# Patient Record
(demographics unavailable — no encounter records)

---

## 2025-01-30 NOTE — HISTORY OF PRESENT ILLNESS
[Never] : never [Difficulty Initiating Sleep] : difficulty initiating sleep [Difficulty Maintaining Sleep] : difficulty maintaining sleep [TextBox_4] : She was evaluated by cardiology in November 2024.  The evaluation included a stress test and an echocardiogram.  She was told that everything was okay.  She continues to complain of intermittent chest discomfort.  A few weeks ago she was seen in the emergency room at [facility].  She was evaluated and discharged.  She is not aware of any specific diagnosis which was given to her.  She was seen by her primary care provider on 1/3/2025.  She was diagnosed with pleurisy.  She was given antibiotics.    At the present time she denies any cough, wheezing or shortness of breath.  She is not using any inhalers.  She does not smoke.  She never smoked. [Awakes Unrefreshed] : does not awaken unrefreshed

## 2025-01-30 NOTE — DISCUSSION/SUMMARY
[FreeTextEntry1] : She is a 60-year-old woman, a never smoker, who presented with a cough.  She is no longer coughing.  She is not using any inhalers.  Impression Atypical chest discomfort -Cardiology evaluation was negative Reactive airways disease -Not active at the present time  Recommend Chest radiograph requested Continue with albuterol as needed, if needed Follow-up in 1 month or sooner if needed

## 2025-01-30 NOTE — REVIEW OF SYSTEMS
[Back Pain] : ~T back pain [Arthralgias] : arthralgias [Chest Tightness] : chest tightness [Fatigue] : no fatigue [Poor Appetite] : normal appetite  [Nasal Congestion] : no nasal congestion [Postnasal Drip] : no postnasal drip [Cough] : no cough [Sputum] : not coughing up ~M sputum [Hemoptysis] : no hemoptysis [Dyspnea] : no dyspnea [Pleuritic Pain] : no pleuritic pain [Wheezing] : no wheezing [Hypertension] : no ~T hypertension [Edema] : ~T edema was not present [Hay Fever] : no hay fever [Itchy Eyes] : no itching of ~T the eyes [Heartburn] : no heartburn [Reflux] : no reflux [Nocturia] : no nocturia [Rash] : no [unfilled] rash [Anemia] : no anemia [Diabetes] : no diabetes mellitus [Rheumatologic] : no ~T rheumatologic disorder

## 2025-01-30 NOTE — PHYSICAL EXAM
[Well Groomed] : well groomed [General Appearance - In No Acute Distress] : no acute distress [Neck Cervical Mass (___cm)] : no neck mass was observed [Heart Rate And Rhythm] : heart rate and rhythm were normal [Heart Sounds] : normal S1 and S2 [Murmurs] : no murmurs present [Auscultation Breath Sounds / Voice Sounds] : lungs were clear to auscultation bilaterally [Abdomen Tenderness] : non-tender [Abnormal Walk] : normal gait [Cyanosis, Localized] : no localized cyanosis [Skin Turgor] : normal skin turgor [] : no rash [No Focal Deficits] : no focal deficits [Oriented To Time, Place, And Person] : oriented to person, place, and time

## 2025-01-30 NOTE — PROCEDURE
[FreeTextEntry1] : Chest x-ray 7/26/18: Persistent blunting at the right costophrenic angle. Felt to be on the basis of pleural thickening.  Chest x-ray 11/22/22: Clear.  PFT 3/1918: No obstruction. Mild restriction. Mild reduction in diffusion.   PFT 3/2/23: Mild obstruction. Mild improvement after bronchodilator.   PFT 1/30/25: No obstruction.  Mild restriction.  Mild reduction in diffusion.  No significant change after bronchodilator.

## 2025-02-27 NOTE — PHYSICAL EXAM
[General Appearance - In No Acute Distress] : no acute distress [Neck Cervical Mass (___cm)] : no neck mass was observed [Heart Rate And Rhythm] : heart rate and rhythm were normal [Heart Sounds] : normal S1 and S2 [Auscultation Breath Sounds / Voice Sounds] : lungs were clear to auscultation bilaterally [Abdomen Tenderness] : non-tender [Abnormal Walk] : normal gait [Cyanosis, Localized] : no localized cyanosis [Skin Turgor] : normal skin turgor [] : no rash [No Focal Deficits] : no focal deficits [Oriented To Time, Place, And Person] : oriented to person, place, and time

## 2025-02-27 NOTE — REVIEW OF SYSTEMS
[Back Pain] : ~T back pain [Arthralgias] : arthralgias [Fatigue] : no fatigue [Poor Appetite] : normal appetite  [Nasal Congestion] : no nasal congestion [Postnasal Drip] : no postnasal drip [Cough] : no cough [Sputum] : not coughing up ~M sputum [Hemoptysis] : no hemoptysis [Dyspnea] : no dyspnea [Chest Tightness] : no chest tightness [Wheezing] : no wheezing [Hypertension] : no ~T hypertension [Edema] : ~T edema was not present [Hay Fever] : no hay fever [Itchy Eyes] : no itching of ~T the eyes [Heartburn] : no heartburn [Reflux] : no reflux [Nocturia] : no nocturia [Rash] : no [unfilled] rash [Anemia] : no anemia [Diabetes] : no diabetes mellitus [Rheumatologic] : no ~T rheumatologic disorder

## 2025-02-27 NOTE — HISTORY OF PRESENT ILLNESS
[Never] : never [Difficulty Initiating Sleep] : difficulty initiating sleep [TextBox_4] : She was evaluated by cardiology in November 2024.  The evaluation included a stress test and an echocardiogram.  She was told that everything was okay.  She continues to complain of intermittent chest discomfort.  A few weeks ago she was seen in the emergency room at [facility].  She was evaluated and discharged.  She is not aware of any specific diagnosis which was given to her.  She was seen by her primary care provider on 1/3/2025.  She was diagnosed with pleurisy.  She was given antibiotics.    She came for follow-up today.  She denied any cough, wheezing or shortness of breath.  She denied any constitutional symptoms. [Awakes Unrefreshed] : does not awaken unrefreshed [Difficulty Maintaining Sleep] : does not have difficulty maintaining sleep

## 2025-02-27 NOTE — PROCEDURE
[FreeTextEntry1] : Chest x-ray 7/26/18: Persistent blunting at the right costophrenic angle. Felt to be on the basis of pleural thickening.  Chest x-ray 11/22/22: Clear.  Chest x-ray 2/24/2025: No acute pulmonary pathology.  PFT 3/1918: No obstruction. Mild restriction. Mild reduction in diffusion.   PFT 3/2/23: Mild obstruction. Mild improvement after bronchodilator.   PFT 1/30/25: No obstruction.  Mild restriction.  Mild reduction in diffusion.  No significant change after bronchodilator.

## 2025-02-27 NOTE — HISTORY OF PRESENT ILLNESS
[Never] : never [Difficulty Initiating Sleep] : difficulty initiating sleep [TextBox_4] : She was evaluated by cardiology in November 2024.  The evaluation included a stress test and an echocardiogram.  She was told that everything was okay.  She continues to complain of intermittent chest discomfort.  A few weeks ago she was seen in the emergency room at [facility].  She was evaluated and discharged.  She is not aware of any specific diagnosis which was given to her.  She was seen by her primary care provider on 1/3/2025.  She was diagnosed with pleurisy.  She was given antibiotics.    She came for follow-up today.  She denied any cough, wheezing or shortness of breath.  She denied any constitutional symptoms. [Awakes Unrefreshed] : does not awaken unrefreshed [Difficulty Maintaining Sleep] : does not have difficulty maintaining sleep

## 2025-02-27 NOTE — DISCUSSION/SUMMARY
[FreeTextEntry1] : She is a 60-year-old woman, a never smoker, who presented with a cough.  She is no longer coughing.  She is not using any inhalers.  Impression Atypical chest discomfort -Resolved -Cardiology evaluation was negative -Chest radiograph was negative Reactive airways disease -Not active at the present time S/P EGD 2/26, possible GERD noted Bx pending  Recommend Observe for now Follow-up with GI I will see her again in 6 months or sooner if needed